# Patient Record
(demographics unavailable — no encounter records)

---

## 2025-05-27 NOTE — REASON FOR VISIT
[Behavioral Health Urgent Care Assessment] : a behavioral health urgent care assessment [Patient] : patient [Self] : alone [Father] : with father [TextBox_17] : evaluation of ADHD symptoms

## 2025-05-27 NOTE — PLAN
[Provision of National Suicide Prevention Lifeline 8-784-651-TALK (1044)] : Provision of national suicide prevention lifeline 8-678-527-talk (5344) [Patient] : patient [Family] : family [Contact was Attempted] : contact was attempted [None on Record] : none on record [Reached regarding Plan] : not reached regarding plan [TextBox_9] : therapy resources, ADHD eval 7/22 at 11 am with Roger in [place]. [facility] provided to the family  [TextBox_11] :   discussed that upon completion of ADHD evaluation, that medication management can be discussed with neurologist [TextBox_13] : no acute safety concerns.  [TextBox_26] : self referred, LVM at school

## 2025-05-27 NOTE — HISTORY OF PRESENT ILLNESS
[Not Applicable] : Not applicable [FreeTextEntry1] : Patient is a 11 y/o female, domiciled with father and his girlfriend, sees mom on weekends, currently enrolled at [employer], 7th grade general education, no formal PPH, not currently in outpatient treatment, no prior psychiatric hospitalization, no self-injury or suicide attempts, no aggression/violence, no substance use, no legal issues, no CPS involvement, no trauma/abuse, no PMH, presenting today with father who were self-referred for evaluation of ADHD symptoms.    [facility] met with the patient who presents as polite and cooperative. She reports symptoms of inattention including difficulty focusing and at times, makes careless mistakes and cannot stick to tasks that are tedious or time consuming. She feels time management and organization skills are dependent on her mood. Patient reports symptoms of hyperactivity including, difficulty staying seated for long periods of time, excessive talking, unable to wait her turn, fidgety behaviors, acting without thinking and calling out or interrupting conversations. She has required redirection in class and at times has gotten into trouble for these behaviors. Patient reports when test taking, she has even greater difficulty focusing as she reports having test anxiety. She identifies schoolwork as her biggest stressor and identifies this as the only time she feels anxious. Denies hx PA. Patient describes her mood as typically feeling happy, she denies persistent sad moods, changes or issues with sleep, appetite, interest, energy or motivation. Denies hx NSSIB or SI. She presents as help seeking and motivated for treatment. Reports primarily living with dad and sees mom on weekends, reports positive relationship with both parents. Denies hx abuse.   [facility] obtained collateral information from father. He shares they are here for evaluation of ADHD symptoms. He fully corroborates with above report, adding that he feels patients ADHD symptoms were more significant when she was younger but feels they persist and warrant further evaluation, especially now that she is in middle school and demands are higher. He adds that he has a good relationship with the patient and she confides in him about how difficult school has been in response to these symptoms. He is hopeful he can help her to receive extra support in the classroom to reduce these stressors. He denies acute safety concerns.  [FreeTextEntry2] : Patient has not had any past psychiatric visits, hospitalizations, medication trials or visits with a therapist. No hx suicidality, suicide attempts or self injurious behaviors.     [FreeTextEntry3] : n/a

## 2025-05-27 NOTE — DISCUSSION/SUMMARY
[Low acute suicide risk] : Low acute suicide risk [No] : No [Not clinically indicated] : Safety Plan completed/updated (for individuals at risk): Not clinically indicated [FreeTextEntry1] : At present, patient has a low risk of harm to self.  Although patient has risk factors including impulsivity, not in treatment, patient has significant protective factors including strong family/social support, domiciled, age, lack of prior self-harm, no suicide attempts, no substance use, no rebeca, no psychosis, no CAH, no psychiatric hospitalization, current willingness to engage in treatment, future orientation with long & short term goals for the future, hopeful, help-seeking, engaged in school & activities, current denial of any SIIP or urges to self-harm, no reported hx of abuse/trauma, no aggression/violence, no access to guns/family is able to means restrict, no legal history.

## 2025-05-27 NOTE — HISTORY OF PRESENT ILLNESS
Patient to MRI    [Not Applicable] : Not applicable [FreeTextEntry1] : Patient is a 13 y/o female, domiciled with father and his girlfriend, sees mom on weekends, currently enrolled at [employer], 7th grade general education, no formal PPH, not currently in outpatient treatment, no prior psychiatric hospitalization, no self-injury or suicide attempts, no aggression/violence, no substance use, no legal issues, no CPS involvement, no trauma/abuse, no PMH, presenting today with father who were self-referred for evaluation of ADHD symptoms.    [facility] met with the patient who presents as polite and cooperative. She reports symptoms of inattention including difficulty focusing and at times, makes careless mistakes and cannot stick to tasks that are tedious or time consuming. She feels time management and organization skills are dependent on her mood. Patient reports symptoms of hyperactivity including, difficulty staying seated for long periods of time, excessive talking, unable to wait her turn, fidgety behaviors, acting without thinking and calling out or interrupting conversations. She has required redirection in class and at times has gotten into trouble for these behaviors. Patient reports when test taking, she has even greater difficulty focusing as she reports having test anxiety. She identifies schoolwork as her biggest stressor and identifies this as the only time she feels anxious. Denies hx PA. Patient describes her mood as typically feeling happy, she denies persistent sad moods, changes or issues with sleep, appetite, interest, energy or motivation. Denies hx NSSIB or SI. She presents as help seeking and motivated for treatment. Reports primarily living with dad and sees mom on weekends, reports positive relationship with both parents. Denies hx abuse.   [facility] obtained collateral information from father. He shares they are here for evaluation of ADHD symptoms. He fully corroborates with above report, adding that he feels patients ADHD symptoms were more significant when she was younger but feels they persist and warrant further evaluation, especially now that she is in middle school and demands are higher. He adds that he has a good relationship with the patient and she confides in him about how difficult school has been in response to these symptoms. He is hopeful he can help her to receive extra support in the classroom to reduce these stressors. He denies acute safety concerns.  [FreeTextEntry2] : Patient has not had any past psychiatric visits, hospitalizations, medication trials or visits with a therapist. No hx suicidality, suicide attempts or self injurious behaviors.     [FreeTextEntry3] : n/a

## 2025-05-27 NOTE — RISK ASSESSMENT
[Clinical Interview] : Clinical Interview [Collateral Sources] : Collateral Sources [None in the patient's lifetime] : None in the patient's lifetime [None Known] : none known [No known risk factors] : No known risk factors [Residential stability] : residential stability [Relationship stability] : relationship stability [Sobriety] : sobriety [Yes] : yes [No] : No [Impulsivity] : impulsivity [Non-compliant or not receiving treatment] : non-compliant or not receiving treatment [None known] : None known [Identifies reasons for living] : identifies reasons for living [Supportive social network of family or friends] : supportive social network of family or friends [Engaged in work or school] : engaged in work or school [de-identified] : father has gun securely locked away at home

## 2025-05-27 NOTE — RISK ASSESSMENT
[Clinical Interview] : Clinical Interview [Collateral Sources] : Collateral Sources [None in the patient's lifetime] : None in the patient's lifetime [None Known] : none known [No known risk factors] : No known risk factors [Residential stability] : residential stability [Relationship stability] : relationship stability [Sobriety] : sobriety [Yes] : yes [No] : No [Impulsivity] : impulsivity [Non-compliant or not receiving treatment] : non-compliant or not receiving treatment [None known] : None known [Identifies reasons for living] : identifies reasons for living [Supportive social network of family or friends] : supportive social network of family or friends [Engaged in work or school] : engaged in work or school [de-identified] : father has gun securely locked away at home

## 2025-05-27 NOTE — PLAN
[Provision of National Suicide Prevention Lifeline 0-302-922-TALK (3355)] : Provision of national suicide prevention lifeline 6-195-236-talk (9252) [Patient] : patient [Family] : family [Contact was Attempted] : contact was attempted [None on Record] : none on record [Reached regarding Plan] : not reached regarding plan [TextBox_9] : therapy resources, ADHD eval 7/22 at 11 am with Roger in [place]. [facility] provided to the family  [TextBox_11] :   discussed that upon completion of ADHD evaluation, that medication management can be discussed with neurologist [TextBox_13] : no acute safety concerns.  [TextBox_26] : self referred, LVM at school

## 2025-05-27 NOTE — ADDENDUM
[FreeTextEntry1] : Patient was seen and examined by me, Dr. Kendra Venegas. I reviewed and agreed with the findings and plan as documented in the [facility] note, unless noted below.

## 2025-07-22 NOTE — REASON FOR VISIT
[Initial Consultation] : an initial consultation for [Father] : father [FreeTextEntry2] : inattention

## 2025-07-22 NOTE — HISTORY OF PRESENT ILLNESS
[FreeTextEntry1] :  ROC is a 12 year old female here for initial evaluation of inattention/hyperactivity   Roc struggles with math according to FOC. If she is interested in a subject she will do well, if not she can lack focus. She has had issues with focus from a young age. She was seen at  urgent care in [place] and then referred here for evaluation. Academically, she is an average student may need a push and easily distracted. [facility] wants services to help her be successful in school.   Educational assessment:  Current Grade: 8th grade  Current District: [facility]/ Current Accommodations/ICT: General education classes, A[employer] reading class   Home assessment: Roc is able to go through morning routine on her own, not very forgetful. She has trouble sitting still at home and school depending on her attention. Father states he was similar. Roc may need help finishing homework depending on subject. Roc has a sister, gets along well with her. Roc enjoys cheerleading, she enjoys doing nails. She usually is involved in boxing and gymnastics. Socially, does well with others, no concerns. No concern for anxiety, depression, OCD, ODD. She lives with father and sees mother on the weekend. Bedtime: 11-12 am, wakes up for school at 6:30 am. She sleeps through the night, at times difficult to settle. Denies staring, eye fluttering, twitching, seizure or seizure-like activity. No serious head injury, meningoencephalitis.  [place] Forms Score Parent: ADD 6/9- (6/9) Hyperactivity 6/9- (6/9) ODD 0/8 - (4/8) Conduct Disorder 0/14 (3/14) Anxiety/depression- 0/7- (3/7)  Performance AVG: 3.5  Teacher: English  ADD 6/9- (6/9) Hyperactivity 1/9- (6/9) ODD/ Conduct Disorder 0/10 - (4/10) Anxiety/depression- 0/7- (3/7)  Performance Avg 4

## 2025-07-22 NOTE — PHYSICAL EXAM
[Well-appearing] : well-appearing [Normocephalic] : normocephalic [No dysmorphic facial features] : no dysmorphic facial features [Neck supple] : neck supple [Straight] : straight [No deformities] : no deformities [Alert] : alert [Well related, good eye contact] : well related, good eye contact [Conversant] : conversant [Normal speech and language] : normal speech and language [Follows instructions well] : follows instructions well [VFF] : VFF [Pupils reactive to light and accommodation] : pupils reactive to light and accommodation [Full extraocular movements] : full extraocular movements [Normal facial sensation to light touch] : normal facial sensation to light touch [No facial asymmetry or weakness] : no facial asymmetry or weakness [Gross hearing intact] : gross hearing intact [Equal palate elevation] : equal palate elevation [Good shoulder shrug] : good shoulder shrug [Normal tongue movement] : normal tongue movement [Midline tongue, no fasciculations] : midline tongue, no fasciculations [Normal axial and appendicular muscle tone] : normal axial and appendicular muscle tone [Gets up on table without difficulty] : gets up on table without difficulty [No pronator drift] : no pronator drift [Normal finger tapping and fine finger movements] : normal finger tapping and fine finger movements [No abnormal involuntary movements] : no abnormal involuntary movements [5/5 strength in proximal and distal muscles of arms and legs] : 5/5 strength in proximal and distal muscles of arms and legs [Walks and runs well] : walks and runs well [Able to do deep knee bend] : able to do deep knee bend [Able to walk on heels] : able to walk on heels [Able to walk on toes] : able to walk on toes [Knee jerks] : knee jerks [Localizes LT and temperature] : localizes LT and temperature [No dysmetria on FTNT] : no dysmetria on FTNT [Good walking balance] : good walking balance [Normal gait] : normal gait [Able to tandem well] : able to tandem well [Negative Romberg] : negative Romberg

## 2025-07-22 NOTE — PLAN
[FreeTextEntry1] : [ ] Accommodations letter provided to parent  [ ] Discussed use of Omega 3 fish oil and magnesium  [ ]Discussed use of medications as well as side effects if accommodations do not improve school performance [ ]Follow up 1 year

## 2025-07-22 NOTE — ASSESSMENT
[FreeTextEntry1] :  ROC is a 12-year-old female presenting for initial evaluation of inattention/hyperactivity   ROC is in a general education classroom setting, will be in 8th grade in the fall. Roc was evaluated by  Urgent care of concerns of inattention, [place] forms reviewed, consistent with ADHD- Inattentive type. No concerns for staring episodes, twitching, rapid eye blinking or seizure-like activity. Non focal neurological exam. Will provide accommodations letter. No medication management needed at this time.